# Patient Record
Sex: FEMALE | Race: WHITE | Employment: STUDENT | URBAN - METROPOLITAN AREA
[De-identification: names, ages, dates, MRNs, and addresses within clinical notes are randomized per-mention and may not be internally consistent; named-entity substitution may affect disease eponyms.]

---
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
## 2023-07-26 ENCOUNTER — TELEPHONE (OUTPATIENT)
Dept: FAMILY MEDICINE CLINIC | Facility: CLINIC | Age: 18
End: 2023-07-26

## 2023-07-26 NOTE — TELEPHONE ENCOUNTER
Called patient to inquire on if she was going to come in for her appt today 07/26/2023 with Dr. Shi Patches

## 2023-08-11 ENCOUNTER — OFFICE VISIT (OUTPATIENT)
Dept: FAMILY MEDICINE CLINIC | Facility: CLINIC | Age: 18
End: 2023-08-11
Payer: COMMERCIAL

## 2023-08-11 VITALS
SYSTOLIC BLOOD PRESSURE: 106 MMHG | HEART RATE: 80 BPM | RESPIRATION RATE: 18 BRPM | TEMPERATURE: 99 F | WEIGHT: 159 LBS | HEIGHT: 66 IN | BODY MASS INDEX: 25.55 KG/M2 | DIASTOLIC BLOOD PRESSURE: 72 MMHG

## 2023-08-11 DIAGNOSIS — F41.1 GAD (GENERALIZED ANXIETY DISORDER): ICD-10-CM

## 2023-08-11 DIAGNOSIS — S91.116A LACERATION OF SECOND TOE: ICD-10-CM

## 2023-08-11 DIAGNOSIS — F32.0 MILD MAJOR DEPRESSION (HCC): ICD-10-CM

## 2023-08-11 DIAGNOSIS — Z00.00 PHYSICAL EXAM, ANNUAL: Primary | ICD-10-CM

## 2023-08-11 DIAGNOSIS — Z13.89 SCREENING FOR GENITOURINARY CONDITION: ICD-10-CM

## 2023-08-11 DIAGNOSIS — Z00.00 LABORATORY EXAMINATION ORDERED AS PART OF A ROUTINE GENERAL MEDICAL EXAMINATION: ICD-10-CM

## 2023-08-11 PROCEDURE — 3078F DIAST BP <80 MM HG: CPT | Performed by: FAMILY MEDICINE

## 2023-08-11 PROCEDURE — 99213 OFFICE O/P EST LOW 20 MIN: CPT | Performed by: FAMILY MEDICINE

## 2023-08-11 PROCEDURE — 3074F SYST BP LT 130 MM HG: CPT | Performed by: FAMILY MEDICINE

## 2023-08-11 PROCEDURE — 99395 PREV VISIT EST AGE 18-39: CPT | Performed by: FAMILY MEDICINE

## 2023-08-11 PROCEDURE — 3008F BODY MASS INDEX DOCD: CPT | Performed by: FAMILY MEDICINE

## 2023-08-11 RX ORDER — LEVONORGESTREL AND ETHINYL ESTRADIOL 0.1-0.02MG
1 KIT ORAL DAILY
Qty: 28 TABLET | Refills: 12 | Status: SHIPPED | OUTPATIENT
Start: 2023-08-11 | End: 2024-08-10

## 2023-08-11 RX ORDER — SERTRALINE HYDROCHLORIDE 25 MG/1
25 TABLET, FILM COATED ORAL DAILY
Qty: 30 TABLET | Refills: 3 | Status: SHIPPED | OUTPATIENT
Start: 2023-08-11

## 2023-08-11 NOTE — PATIENT INSTRUCTIONS
Continue sertraline 75 mg daily. Continue birth control pills at current dose. Change dressing every day to the laceration on your toe. Monitor. Healthy diet. Stay active. Follow-up in December for medication check.

## 2024-08-21 NOTE — TELEPHONE ENCOUNTER
Please call patient to schedule a physical/med check, then route to Dr. Velázquez. Thank you!      Last Office Visit: 8/11/23  Last Refill: 8/11/23  Return to Clinic: December med check   Protocol: failed   NOV: n/a    Requested Prescriptions     Pending Prescriptions Disp Refills    VIENVA 0.1-20 MG-MCG Oral Tab [Pharmacy Med Name: Vienva Oral Tablet 0.1-20 MG-MCG] 28 tablet 0     Sig: TAKE 1 TABLET BY MOUTH DAILY. *GENERIC FOR MARYSOLE*       Please approve if appropriate.     Thank you!

## 2024-08-23 RX ORDER — TIMOLOL MALEATE 5 MG/ML
SOLUTION/ DROPS OPHTHALMIC
Qty: 90 TABLET | Refills: 0 | Status: SHIPPED | OUTPATIENT
Start: 2024-08-23

## 2024-10-08 ENCOUNTER — TELEPHONE (OUTPATIENT)
Dept: DERMATOLOGY | Age: 19
End: 2024-10-08

## 2024-10-09 ENCOUNTER — TELEPHONE (OUTPATIENT)
Dept: DERMATOLOGY | Age: 19
End: 2024-10-09

## 2024-11-12 ENCOUNTER — OFFICE VISIT (OUTPATIENT)
Dept: FAMILY MEDICINE CLINIC | Facility: CLINIC | Age: 19
End: 2024-11-12
Payer: COMMERCIAL

## 2024-11-12 VITALS
RESPIRATION RATE: 18 BRPM | SYSTOLIC BLOOD PRESSURE: 118 MMHG | DIASTOLIC BLOOD PRESSURE: 73 MMHG | WEIGHT: 174 LBS | HEART RATE: 84 BPM | TEMPERATURE: 97 F | HEIGHT: 66 IN | BODY MASS INDEX: 27.97 KG/M2

## 2024-11-12 DIAGNOSIS — Z00.00 LABORATORY EXAMINATION ORDERED AS PART OF A ROUTINE GENERAL MEDICAL EXAMINATION: ICD-10-CM

## 2024-11-12 DIAGNOSIS — Z00.00 PHYSICAL EXAM, ANNUAL: Primary | ICD-10-CM

## 2024-11-12 DIAGNOSIS — Z13.89 SCREENING FOR GENITOURINARY CONDITION: ICD-10-CM

## 2024-11-12 DIAGNOSIS — Z23 NEED FOR TDAP VACCINATION: ICD-10-CM

## 2024-11-12 DIAGNOSIS — L70.0 ACNE VULGARIS: ICD-10-CM

## 2024-11-12 PROCEDURE — 90715 TDAP VACCINE 7 YRS/> IM: CPT | Performed by: FAMILY MEDICINE

## 2024-11-12 PROCEDURE — 3078F DIAST BP <80 MM HG: CPT | Performed by: FAMILY MEDICINE

## 2024-11-12 PROCEDURE — 3008F BODY MASS INDEX DOCD: CPT | Performed by: FAMILY MEDICINE

## 2024-11-12 PROCEDURE — 99395 PREV VISIT EST AGE 18-39: CPT | Performed by: FAMILY MEDICINE

## 2024-11-12 PROCEDURE — 3074F SYST BP LT 130 MM HG: CPT | Performed by: FAMILY MEDICINE

## 2024-11-12 PROCEDURE — 90471 IMMUNIZATION ADMIN: CPT | Performed by: FAMILY MEDICINE

## 2024-11-12 RX ORDER — LEVONORGESTREL/ETHIN.ESTRADIOL 0.1-0.02MG
1 TABLET ORAL DAILY
Qty: 90 TABLET | Refills: 3 | Status: SHIPPED | OUTPATIENT
Start: 2024-11-12

## 2024-11-12 RX ORDER — CLINDAMYCIN PHOSPHATE 10 MG/G
1 GEL TOPICAL NIGHTLY
Qty: 45 G | Refills: 1 | Status: SHIPPED | OUTPATIENT
Start: 2024-11-12 | End: 2025-11-07

## 2024-11-12 NOTE — PATIENT INSTRUCTIONS
Use clindamycin gel topically.   Try Vanicream moisturizer.  Use salicylic acid and tretinoin as needed.  Healthy diet.  Stay active.   Do fasting blood work.  Set up an appointment with gynecologist.  Schedule follow-up visit for February 2025.

## 2024-11-12 NOTE — PROGRESS NOTES
HPI:   Alen Rajput is a 19 year old female who presents for a complete physical exam. Symptoms: denies discharge, itching, burning or dysuria. Patient acne.  Will try topical antibiotic.  She will try to clean her face using paper towels she will continue using tretinoin and salicylic acid as needed monitor.  She would like referral to OB/GYN    Immunization History   Administered Date(s) Administered    Covid-19 Vaccine Pfizer 30 mcg/0.3 ml 04/21/2021, 05/12/2021, 12/29/2021    DTAP 08/25/2003, 10/24/2003, 01/09/2004, 12/20/2004, 04/08/2008    HEP A 06/24/2013    HEP A,Ped/Adol,(2 Dose) 07/10/2014    HEP B 08/25/2003, 10/24/2003, 01/09/2004    HIB 08/25/2003, 10/24/2003, 01/09/2004, 12/20/2004    Hpv Virus Vaccine 9 Maryann Im 06/27/2018, 08/28/2018, 01/02/2019    IPV 08/25/2003, 01/09/2004, 12/20/2004    Influenza 10/04/2004, 11/19/2004    MMR 10/04/2004, 04/08/2008    Meningococcal-Menactra 07/10/2014    Meningococcal-Menveo 2month-55yr 07/02/2020    TDAP 07/10/2014, 11/12/2024    Varicella 07/22/2004, 04/08/2008      Wt Readings from Last 6 Encounters:   11/12/24 174 lb (78.9 kg)   08/11/23 159 lb (72.1 kg)   05/10/23 151 lb (68.5 kg) (81%, Z= 0.87)*   04/03/23 156 lb (70.8 kg) (85%, Z= 1.02)*   11/29/22 145 lb (65.8 kg) (76%, Z= 0.71)*   11/08/22 145 lb (65.8 kg) (76%, Z= 0.71)*     * Growth percentiles are based on CDC (Girls, 2-20 Years) data.     Body mass index is 28.08 kg/m².     No results found for: \"CHOLEST\", \"HDL\", \"LDL\", \"TRIGLY\", \"AST\", \"ALT\"   No results found for: \"GLUCOSE\"     Current Outpatient Medications   Medication Sig Dispense Refill    Levonorgestrel-Ethinyl Estrad (VIENVA) 0.1-20 MG-MCG Oral Tab Take 1 tablet by mouth daily. 90 tablet 3    Clindamycin Phosphate 1 % External Gel Apply 1 Application topically nightly. 45 g 1    tretinoin 0.025 % External Cream         History reviewed. No pertinent past medical history.   History reviewed. No pertinent surgical history.   Family History    Problem Relation Age of Onset    Diabetes Maternal Grandmother     Diabetes Mother     Cancer Maternal Grandfather         lung and liver ca    Stroke Neg     Heart Disease Neg       Social History:   Social History     Socioeconomic History    Marital status: Single   Tobacco Use    Smoking status: Never    Smokeless tobacco: Never   Vaping Use    Vaping status: Never Used   Substance and Sexual Activity    Alcohol use: No     Alcohol/week: 0.0 standard drinks of alcohol    Drug use: No    Sexual activity: Never   Other Topics Concern    Caffeine Concern No     Comment: socially    Exercise Yes     Comment: age appropriate    Seat Belt Yes     Social Drivers of Health      Received from [facility], [facility]    Housing Stability     Occ: Graduated college working as TA. : no Children: no.   Exercise: three times per week.  Diet: watches calories closely     REVIEW OF SYSTEMS:   GENERAL: feels well otherwise  SKIN: denies any unusual skin lesions  EYES:denies blurred vision or double vision  HEENT: denies nasal congestion, sinus pain or ST  LUNGS: denies shortness of breath with exertion  CARDIOVASCULAR: denies chest pain on exertion  GI: denies abdominal pain,denies heartburn  : denies dysuria, vaginal discharge or itching,periods regular   MUSCULOSKELETAL: denies back pain  NEURO: denies headaches  PSYCHE: denies depression or anxiety  HEMATOLOGIC: denies hx of anemia  ENDOCRINE: denies thyroid history  ALL/ASTHMA: denies hx of allergy or asthma    EXAM:   /73 (BP Location: Left arm, Patient Position: Sitting, Cuff Size: adult)   Pulse 84   Temp 97.3 °F (36.3 °C) (Temporal)   Resp 18   Ht 5' 6\" (1.676 m)   Wt 174 lb (78.9 kg)   LMP 10/17/2024   BMI 28.08 kg/m²   Body mass index is 28.08 kg/m².   GENERAL: well developed, well nourished,in no apparent distress  SKIN: no rashes,no suspicious lesions acne face,   HEENT: atraumatic, normocephalic,ears and  throat are clear  EYES:PERRLA, EOMI, normal optic disk,conjunctiva are clear  NECK: supple,no adenopathy,  CHEST: no chest tenderness  BREAST: Deferred  LUNGS: clear to auscultation  CARDIO: RRR without murmur  GI: good BS's,no masses, HSM or tenderness  : Deferred will be done by GYN  RECTAL: Deferred  MUSCULOSKELETAL: back is not tender,FROM of the back  EXTREMITIES: no cyanosis, clubbing or edema  NEURO: Oriented times three,cranial nerves are intact,motor and sensory are grossly intact    ASSESSMENT AND PLAN:   Alen Rajput is a 19 year old female who presents for a complete physical exam.   Encounter Diagnoses   Name Primary?    Physical exam, annual Yes    Laboratory examination ordered as part of a routine general medical examination     Screening for genitourinary condition     Acne vulgaris     Need for Tdap vaccination        Orders Placed This Encounter   Procedures    CBC With Differential With Platelet    Comp Metabolic Panel (14)    Lipid Panel    Urinalysis with Culture Reflex    TSH W Reflex To Free T4    TdaP (Adacel, Boostrix) [[id number]]       Meds & Refills for this Visit:  Requested Prescriptions     Signed Prescriptions Disp Refills    Levonorgestrel-Ethinyl Estrad (VIENVA) 0.1-20 MG-MCG Oral Tab 90 tablet 3     Sig: Take 1 tablet by mouth daily.    Clindamycin Phosphate 1 % External Gel 45 g 1     Sig: Apply 1 Application topically nightly.   Use clindamycin gel topically.   Try Vanicream moisturizer.  Use salicylic acid and tretinoin as needed.  Healthy diet.  Stay active.   Do fasting blood work.  Set up an appointment with gynecologist.  Schedule follow-up visit for February 2025.    Imaging & Consults:  OBG - INTERNAL  TETANUS, DIPHTHERIA TOXOIDS AND ACELLULAR PERTUSIS VACCINE (TDAP), >7 YEARS, IM USE     Pap and pelvic by GYN.   Self breast exam explained. Health maintenance, will check fasting Lipids, CMP, and CBC. Pt will be at 43 referred for screening colonoscopy. Pt' s weight is  Body mass index is 28.08 kg/m²., recommended low carb diet and aerobic exercise 30 minutes three times weekly.  The patient indicates understanding of these issues and agrees to the plan.  The patient is asked to return for CPX in 1 year.  Med check February 2025.

## 2024-12-19 DIAGNOSIS — Z13.89 SCREENING FOR GENITOURINARY CONDITION: ICD-10-CM

## 2024-12-19 DIAGNOSIS — Z00.00 PHYSICAL EXAM, ANNUAL: Primary | ICD-10-CM

## 2025-01-01 LAB
ABSOLUTE BASOPHILS: 50 CELLS/UL (ref 0–200)
ABSOLUTE EOSINOPHILS: 256 CELLS/UL (ref 15–500)
ABSOLUTE LYMPHOCYTES: 2620 CELLS/UL (ref 850–3900)
ABSOLUTE MONOCYTES: 462 CELLS/UL (ref 200–950)
ABSOLUTE NEUTROPHILS: 3713 CELLS/UL (ref 1500–7800)
ALBUMIN/GLOBULIN RATIO: 1.9 (CALC) (ref 1–2.5)
ALBUMIN: 4.3 G/DL (ref 3.6–5.1)
ALKALINE PHOSPHATASE: 64 U/L (ref 31–125)
ALT: 14 U/L (ref 6–29)
AST: 18 U/L (ref 10–30)
BASOPHILS: 0.7 %
BILIRUBIN, TOTAL: 0.5 MG/DL (ref 0.2–1.2)
BILIRUBIN: NEGATIVE
BUN: 12 MG/DL (ref 7–25)
CALCIUM: 9.4 MG/DL (ref 8.6–10.2)
CARBON DIOXIDE: 23 MMOL/L (ref 20–32)
CHLORIDE: 104 MMOL/L (ref 98–110)
CHOL/HDLC RATIO: 3.2 (CALC)
CHOLESTEROL, TOTAL: 221 MG/DL
COLOR: YELLOW
CREATININE: 0.82 MG/DL (ref 0.5–0.96)
EGFR: 104 ML/MIN/1.73M2
EOSINOPHILS: 3.6 %
GLOBULIN: 2.3 G/DL (CALC) (ref 1.9–3.7)
GLUCOSE: 82 MG/DL (ref 65–99)
GLUCOSE: NEGATIVE
HDL CHOLESTEROL: 70 MG/DL
HEMATOCRIT: 41.8 % (ref 35–45)
HEMOGLOBIN: 13.7 G/DL (ref 11.7–15.5)
KETONES: NEGATIVE
LDL-CHOLESTEROL: 123 MG/DL (CALC)
LYMPHOCYTES: 36.9 %
MCH: 28.5 PG (ref 27–33)
MCHC: 32.8 G/DL (ref 32–36)
MCV: 87.1 FL (ref 80–100)
MONOCYTES: 6.5 %
MPV: 10.2 FL (ref 7.5–12.5)
NEUTROPHILS: 52.3 %
NITRITE: NEGATIVE
NON-HDL CHOLESTEROL: 151 MG/DL (CALC)
PH: 6 (ref 5–8)
PLATELET COUNT: 321 THOUSAND/UL (ref 140–400)
POTASSIUM: 4 MMOL/L (ref 3.5–5.3)
PROTEIN, TOTAL: 6.6 G/DL (ref 6.1–8.1)
PROTEIN: NEGATIVE
RDW: 12.9 % (ref 11–15)
RED BLOOD CELL COUNT: 4.8 MILLION/UL (ref 3.8–5.1)
SODIUM: 137 MMOL/L (ref 135–146)
SPECIFIC GRAVITY: 1.02 (ref 1–1.03)
TRIGLYCERIDES: 161 MG/DL
TSH W/REFLEX TO FT4: 0.59 MIU/L
WHITE BLOOD CELL COUNT: 7.1 THOUSAND/UL (ref 3.8–10.8)

## 2025-01-02 ENCOUNTER — TELEPHONE (OUTPATIENT)
Dept: FAMILY MEDICINE CLINIC | Facility: CLINIC | Age: 20
End: 2025-01-02

## 2025-01-02 NOTE — TELEPHONE ENCOUNTER
Patient informed of Abby's recommendation below. Patient agreed to go to [facility] for reevaluation.

## 2025-01-02 NOTE — TELEPHONE ENCOUNTER
Patient should be reevaluated in the office today as urine should be re-tested given new symptoms. Previous urine culture dated 12/30 only showed mixed genital prince. If no availability in our office today, recommend Walk In Clinic today

## 2025-01-02 NOTE — TELEPHONE ENCOUNTER
Dr Ceja patient. LOV: 11/12/24    Patient had labs and urine specimen done on 12/30/24. Urine came back abnormal.     Patient has complaints of lower abdominal pain, discomfort to back, fullness feeling to bladder, slight cramps, and odor to urine. Denies hematuria and cloudy urine.     Patient was not on menses at time of specimen given. LMP: 12/12/24.     Patient requesting medication to treat symptoms.

## 2025-03-25 ENCOUNTER — PATIENT MESSAGE (OUTPATIENT)
Dept: FAMILY MEDICINE CLINIC | Facility: CLINIC | Age: 20
End: 2025-03-25

## 2025-03-25 NOTE — TELEPHONE ENCOUNTER
Last office visit with Dr. Zarco on 2/25/25 for generalized anxiety disorder.    On buspirone 7.5mg

## 2025-03-27 DIAGNOSIS — F41.1 GAD (GENERALIZED ANXIETY DISORDER): ICD-10-CM

## 2025-03-27 RX ORDER — BUSPIRONE HYDROCHLORIDE 7.5 MG/1
7.5 TABLET ORAL 2 TIMES DAILY
Qty: 180 TABLET | Refills: 0 | Status: SHIPPED | OUTPATIENT
Start: 2025-03-27 | End: 2025-06-25

## 2025-03-27 NOTE — TELEPHONE ENCOUNTER
LOV: 2/25/25 for SHARIF    busPIRone HCl 7.5 MG Oral Tab  Take 1 tablet (7.5 mg total) by mouth in the morning and 1 tablet (7.5 mg total) before bedtime. Dispense: 60 tablet, Refills: 1 ordered        02/25/2025     No future appointments.  Please advise

## 2025-04-14 ENCOUNTER — PATIENT MESSAGE (OUTPATIENT)
Dept: FAMILY MEDICINE CLINIC | Facility: CLINIC | Age: 20
End: 2025-04-14

## 2025-05-07 ENCOUNTER — PATIENT MESSAGE (OUTPATIENT)
Dept: FAMILY MEDICINE CLINIC | Facility: CLINIC | Age: 20
End: 2025-05-07

## 2025-05-07 NOTE — TELEPHONE ENCOUNTER
Brielle Zarco MD to [facility] Clinical Staff (Selected Message)        5/7/25  3:01 PM  She can also do flonase

## 2025-05-07 NOTE — TELEPHONE ENCOUNTER
Patient has nasal congestion, post nasal drip  Wants to know if there is a decongestant that is safe to take with Claritin  Please advise

## 2025-05-19 ENCOUNTER — PATIENT MESSAGE (OUTPATIENT)
Dept: FAMILY MEDICINE CLINIC | Facility: CLINIC | Age: 20
End: 2025-05-19

## 2025-07-10 ENCOUNTER — TELEPHONE (OUTPATIENT)
Dept: FAMILY MEDICINE CLINIC | Facility: CLINIC | Age: 20
End: 2025-07-10

## 2025-07-10 NOTE — TELEPHONE ENCOUNTER
Patient scheduled with dr zarco on 7/14/25 for   \"Stomach issues\"  Ok to keep, or will she need to be seen sooner?    Future Appointments   Date Time Provider Department Center   7/14/2025  8:40 AM Brielle Zarco MD [facility] [place]

## 2025-07-11 NOTE — TELEPHONE ENCOUNTER
Spoke with patient  States about a week ago started having on and off sharp lower abdominal pain  Doesn't feel like menstrual cramps  Eating makes it worse  Feels bloated  Diet has not changed  Yesterday pain was better  Doesn't radiate  No swelling  No fevers  Some constipation-last BM  yesterday  Has felt urge to have BM and then nothing happened today  No nausea, vomiting or diarrhea  Passing gas  Still has gallbladder and appendix  Advised to keep appointment and if symptoms worsen or develops fever or nausea/vomiting go to ER. Verbalized understanding.    Future Appointments   Date Time Provider Department Center   7/14/2025  8:40 AM Brielle Zarco MD [facility] [place]

## (undated) NOTE — MR AVS SNAPSHOT
[facility] [address] [phone]               Thank you for choosing us for your health care visit with Ryley Leahy MD.  We are glad to serve you and happy to provide you with this mahajan with others in the family? Is he or she respectful of you, other adults, and authority? Does your child participate in family events, or does he or she withdraw from other family members? · Risky behaviors.  Many teenagers are curious about drugs, alcohol, or martial arts classes, riding a bike, or even walking to school or a friend’s house counts as activity.    · Limit “screen time” to 1 hour to 2 hours each day. This includes time spent watching TV, playing video games, using the computer, and texting.  If During the teen years, sleep patterns may change. Many teenagers have a hard time falling asleep, which can lead to sleeping late the next morning.  Here are some tips to help your teen get the rest he or she needs:  · Encourage your teen to keep a consiste · Set rules and limits around driving and use of the car. If your teen gets a ticket or has an accident, there should be consequences. Driving is a privilege that can be taken away if your child doesn’t follow the rules.   · Teach your child to make good de [address]. All rights reserved. This information is not intended as a substitute for professional medical care. Always follow your healthcare professional's instructions.              Allergies as of Jun 23, 2017     No Known Allergies                Today's Vi o creating a rainbow shopping list to find colorful fruits and vegetables  o go on a walking scavenger hunt through the neighborhood   o grow a family garden    In addition to 5, 4, 3, 2, 1 families can make small changes in their family routines to help e

## (undated) NOTE — LETTER
Date: 8/28/2018    Patient Name: Kesha Danielle          To Whom it may concern: This letter has been written at the patient's request. The above patient was seen at the [facility]. The patient may return to school on 8/28/18.